# Patient Record
Sex: FEMALE | HISPANIC OR LATINO | Employment: FULL TIME | URBAN - METROPOLITAN AREA
[De-identification: names, ages, dates, MRNs, and addresses within clinical notes are randomized per-mention and may not be internally consistent; named-entity substitution may affect disease eponyms.]

---

## 2019-03-19 ENCOUNTER — OFFICE VISIT (OUTPATIENT)
Dept: FAMILY MEDICINE | Facility: CLINIC | Age: 19
End: 2019-03-19
Payer: COMMERCIAL

## 2019-03-19 ENCOUNTER — LAB VISIT (OUTPATIENT)
Dept: LAB | Facility: HOSPITAL | Age: 19
End: 2019-03-19
Attending: FAMILY MEDICINE
Payer: COMMERCIAL

## 2019-03-19 VITALS
HEIGHT: 65 IN | BODY MASS INDEX: 26.85 KG/M2 | WEIGHT: 161.19 LBS | DIASTOLIC BLOOD PRESSURE: 70 MMHG | HEART RATE: 85 BPM | TEMPERATURE: 99 F | OXYGEN SATURATION: 100 % | SYSTOLIC BLOOD PRESSURE: 108 MMHG

## 2019-03-19 DIAGNOSIS — Z13.5 SCREENING FOR EYE CONDITION: ICD-10-CM

## 2019-03-19 DIAGNOSIS — Z01.419 WELL WOMAN EXAM: Primary | ICD-10-CM

## 2019-03-19 DIAGNOSIS — Z23 NEED FOR HPV VACCINATION: ICD-10-CM

## 2019-03-19 DIAGNOSIS — Z91.89 EXCESSIVE CONSUMPTION OF SODA POP: ICD-10-CM

## 2019-03-19 DIAGNOSIS — T30.0 BURN: ICD-10-CM

## 2019-03-19 DIAGNOSIS — Z23 NEED FOR DIPHTHERIA-TETANUS-PERTUSSIS (TDAP) VACCINE: ICD-10-CM

## 2019-03-19 DIAGNOSIS — Z01.419 WELL WOMAN EXAM: ICD-10-CM

## 2019-03-19 DIAGNOSIS — G44.229 CHRONIC TENSION-TYPE HEADACHE, NOT INTRACTABLE: ICD-10-CM

## 2019-03-19 DIAGNOSIS — Z23 NEED FOR INFLUENZA VACCINATION: ICD-10-CM

## 2019-03-19 LAB
BASOPHILS # BLD AUTO: 0.08 K/UL
BASOPHILS NFR BLD: 0.7 %
DIFFERENTIAL METHOD: ABNORMAL
EOSINOPHIL # BLD AUTO: 0.2 K/UL
EOSINOPHIL NFR BLD: 1.9 %
ERYTHROCYTE [DISTWIDTH] IN BLOOD BY AUTOMATED COUNT: 15.9 %
HCT VFR BLD AUTO: 35.7 %
HGB BLD-MCNC: 10.7 G/DL
IMM GRANULOCYTES # BLD AUTO: 0.03 K/UL
IMM GRANULOCYTES NFR BLD AUTO: 0.3 %
LYMPHOCYTES # BLD AUTO: 3.5 K/UL
LYMPHOCYTES NFR BLD: 32.1 %
MCH RBC QN AUTO: 24.8 PG
MCHC RBC AUTO-ENTMCNC: 30 G/DL
MCV RBC AUTO: 83 FL
MONOCYTES # BLD AUTO: 0.8 K/UL
MONOCYTES NFR BLD: 7.4 %
NEUTROPHILS # BLD AUTO: 6.3 K/UL
NEUTROPHILS NFR BLD: 57.6 %
NRBC BLD-RTO: 0 /100 WBC
PLATELET # BLD AUTO: 399 K/UL
PMV BLD AUTO: 11.7 FL
RBC # BLD AUTO: 4.31 M/UL
WBC # BLD AUTO: 10.99 K/UL

## 2019-03-19 PROCEDURE — 85025 COMPLETE CBC W/AUTO DIFF WBC: CPT

## 2019-03-19 PROCEDURE — 90472 IMMUNIZATION ADMIN EACH ADD: CPT | Mod: S$GLB,,, | Performed by: FAMILY MEDICINE

## 2019-03-19 PROCEDURE — 99999 PR PBB SHADOW E&M-NEW PATIENT-LVL V: CPT | Mod: PBBFAC,,, | Performed by: FAMILY MEDICINE

## 2019-03-19 PROCEDURE — 99385 PREV VISIT NEW AGE 18-39: CPT | Mod: 25,S$GLB,, | Performed by: FAMILY MEDICINE

## 2019-03-19 PROCEDURE — 83036 HEMOGLOBIN GLYCOSYLATED A1C: CPT

## 2019-03-19 PROCEDURE — 80053 COMPREHEN METABOLIC PANEL: CPT

## 2019-03-19 PROCEDURE — 90715 TDAP VACCINE GREATER THAN OR EQUAL TO 7YO IM: ICD-10-PCS | Mod: S$GLB,,, | Performed by: FAMILY MEDICINE

## 2019-03-19 PROCEDURE — 90686 FLU VACCINE (QUAD) GREATER THAN OR EQUAL TO 3YO PRESERVATIVE FREE IM: ICD-10-PCS | Mod: S$GLB,,, | Performed by: FAMILY MEDICINE

## 2019-03-19 PROCEDURE — 99385 PR PREVENTIVE VISIT,NEW,18-39: ICD-10-PCS | Mod: 25,S$GLB,, | Performed by: FAMILY MEDICINE

## 2019-03-19 PROCEDURE — 82306 VITAMIN D 25 HYDROXY: CPT

## 2019-03-19 PROCEDURE — 80061 LIPID PANEL: CPT

## 2019-03-19 PROCEDURE — 90651 HPV VACCINE 9-VALENT 3 DOSE IM: ICD-10-PCS | Mod: S$GLB,,, | Performed by: FAMILY MEDICINE

## 2019-03-19 PROCEDURE — 99999 PR PBB SHADOW E&M-NEW PATIENT-LVL V: ICD-10-PCS | Mod: PBBFAC,,, | Performed by: FAMILY MEDICINE

## 2019-03-19 PROCEDURE — 90651 9VHPV VACCINE 2/3 DOSE IM: CPT | Mod: S$GLB,,, | Performed by: FAMILY MEDICINE

## 2019-03-19 PROCEDURE — 90686 IIV4 VACC NO PRSV 0.5 ML IM: CPT | Mod: S$GLB,,, | Performed by: FAMILY MEDICINE

## 2019-03-19 PROCEDURE — 90715 TDAP VACCINE 7 YRS/> IM: CPT | Mod: S$GLB,,, | Performed by: FAMILY MEDICINE

## 2019-03-19 PROCEDURE — 90472 HPV VACCINE 9-VALENT 3 DOSE IM: ICD-10-PCS | Mod: S$GLB,,, | Performed by: FAMILY MEDICINE

## 2019-03-19 PROCEDURE — 84443 ASSAY THYROID STIM HORMONE: CPT

## 2019-03-19 PROCEDURE — 36415 COLL VENOUS BLD VENIPUNCTURE: CPT | Mod: PO

## 2019-03-19 PROCEDURE — 90471 FLU VACCINE (QUAD) GREATER THAN OR EQUAL TO 3YO PRESERVATIVE FREE IM: ICD-10-PCS | Mod: S$GLB,,, | Performed by: FAMILY MEDICINE

## 2019-03-19 PROCEDURE — 90471 IMMUNIZATION ADMIN: CPT | Mod: S$GLB,,, | Performed by: FAMILY MEDICINE

## 2019-03-19 RX ORDER — SILVER SULFADIAZINE 10 G/1000G
CREAM TOPICAL 2 TIMES DAILY
Qty: 50 G | Refills: 0 | Status: SHIPPED | OUTPATIENT
Start: 2019-03-19 | End: 2019-10-24

## 2019-03-19 NOTE — PROGRESS NOTES
Subjective:       Patient ID: Lucia Emmanuel is a 18 y.o. female.    Chief Complaint: Annual Exam and Establish Care    Lucia Emmanuel is a 18 y.o. female who presents today to establish care.     Diet: she eats fast food about 3 times per week. She eats fried food about 4 times/week. Drinks soda daily. Coffee occasionally. Water daily.   Exercise: no.     She has headaches almost daily.  She has no associated symptoms with the headaches.  Headaches appear to be bilateral.  They seemed to travel down her neck.  She also has a burn on her leg.  This is from yesterday.  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  She is having no pain or drainage or fevers today.    Has scars noted on her forearms.  Upon further questioning, she reports that she had a history of self-harm when she was a teenager.  She has no current suicidal ideation but would like to see a counselor and or therapist.  She misses home also.    Flu: ordered  Tdap: ordered  HPV Vaccine: ordered  Labs: ordered    PMHx: reviewed in EMR and updated  Meds: reviewed in EMR and updated  Shx: reviewed in EMR and updated  FMHx: no family history of colon cancer, breast cancer, ovarian cancer  Social:  she is working at [employer]. She lives with her sister. No pets at home. No other smokers at home. She is originally from [place].         Review of Systems   Constitutional: Negative for chills and fever.   Respiratory: Negative for cough and shortness of breath.    Cardiovascular: Negative for chest pain.   Gastrointestinal: Negative for constipation, diarrhea, nausea and vomiting.   Genitourinary: Negative for difficulty urinating and dysuria.   Skin: Negative for rash.   Neurological: Negative for dizziness, light-headedness and headaches.         Health Maintenance Due   Topic Date Due    Lipid Panel  1999    CHLAMYDIA SCREENING  05/20/2014    HPV Vaccines (1 - Female 3-dose series) 05/20/2014    TETANUS VACCINE  05/20/2017     Pneumococcal Vaccine (Medium Risk) (1 of 1 - PPSV23) 05/20/2018    Influenza Vaccine  08/01/2018         Objective:     Vitals:    03/19/19 1553   BP: 108/70   Pulse: 85   Temp: 98.8 °F (37.1 °C)        Physical Exam   Constitutional: She is oriented to person, place, and time. She appears well-developed and well-nourished.   HENT:   Head: Normocephalic and atraumatic.   Right Ear: Tympanic membrane, external ear and ear canal normal.   Left Ear: Tympanic membrane, external ear and ear canal normal.   Nose: Nose normal.   Mouth/Throat: Oropharynx is clear and moist and mucous membranes are normal.   Eyes: Conjunctivae are normal. Pupils are equal, round, and reactive to light.   Neck: Normal range of motion.   Cardiovascular: Normal rate, regular rhythm and normal heart sounds.   Pulmonary/Chest: Effort normal and breath sounds normal. No respiratory distress.   Abdominal: Soft. Bowel sounds are normal. She exhibits no distension.   Genitourinary:   Genitourinary Comments: deferred    Musculoskeletal: She exhibits no edema.   Tight cervical paraspinals noted   Neurological: She is alert and oriented to person, place, and time. No cranial nerve deficit or sensory deficit. She exhibits normal muscle tone.   Finger to nose intact  Heel to shin intact  Strength and sensation grossly intract BL UE/LE  CN 2-12 grossly intact  PERRLA  Rapid alternating movement intact  Patellar reflex present and equal BL   Skin:   Second degree burn noted  See picture below    Scars horizontal to her forearm noted, left arm worse then the right.    Psychiatric: She has a normal mood and affect. Her speech is normal and behavior is normal. Judgment and thought content normal.   Nursing note and vitals reviewed.              Assessment:       1. Well woman exam    2. Screening for eye condition    3. Chronic tension-type headache, not intractable    4. Burn    5. History of intentional self-harm    6. BMI 26.0-26.9,adult    7. Excessive  consumption of soda pop    8. Need for diphtheria-tetanus-pertussis (Tdap) vaccine    9. Need for influenza vaccination    10. Need for HPV vaccination        Plan:         Well woman exam  ?Avoid tobacco  ?Be physically active  ?Maintain a healthy weight  ?Eat a diet rich in fruits, vegetables, and whole grains, and low in saturated/trans fat  ?Limit alcohol consumption  ?Protect against sexually transmitted infections  ?Avoid excess sun  -     CBC auto differential; Future; Expected date: 03/19/2019  -     Comprehensive metabolic panel; Future; Expected date: 03/19/2019  -     Lipid panel; Future; Expected date: 03/19/2019  -     Hemoglobin A1c; Future; Expected date: 03/19/2019  -     Vitamin D; Future; Expected date: 03/19/2019  -     TSH; Future; Expected date: 03/19/2019    Screening for eye condition  -     Ambulatory referral to Optometry    Chronic tension-type headache, not intractable  Tight cervical paraspinals noted  OMT performed - HVLA  Tolerated well by patient  Rule out vision issues with eye exam  Discussed stretches    Burn  Appears to be superficial second-degree  Burns can be evolving and this happened about 24 hours ago  Instructions given  Silvadene given  Follow up in 2 weeks, earlier if symptoms worsen, redness develops, pain worsens.   -     silver sulfADIAZINE 1% (SILVADENE) 1 % cream; Apply topically 2 (two) times daily.  Dispense: 50 g; Refill: 0    History of intentional self-harm  See HPI  -     Ambulatory referral to Social Work  -     Ambulatory referral to Psychology    BMI 26.0-26.9,adult/Excessive consumption of soda pop  See AVS    Need for diphtheria-tetanus-pertussis (Tdap) vaccine  -     Tdap Vaccine    Need for influenza vaccination  -     Influenza - Quadrivalent (3 years & older) (PF)    Need for HPV vaccination  -     HPV Vaccine (9-Valent) (3 Dose) (IM)  -     HPV Vaccine (9-Valent) (3 Dose) (IM); Future; Expected date: 06/19/2019  -     HPV Vaccine (9-Valent) (3 Dose)  (IM); Future; Expected date: 09/19/2019

## 2019-03-19 NOTE — PATIENT INSTRUCTIONS
?Avoid tobacco  ?Be physically active  ?Maintain a healthy weight  ?Eat a diet rich in fruits, vegetables, and whole grains, and low in saturated/trans fat  ?Limit alcohol consumption  ?Protect against sexually transmitted infections  ?Avoid excess sun    Stop smoking! Follow up with psychology. Decrease soda intake  Keep burn wrapped and covered with the cream I have given you. Apply twice daily.       Hot Water Burn  Hot water on the skin can cause a first- or second-degree burn. A first-degree burn causes only redness and heals in a few days. A second-degree burn is deeper. It causes a blister to form. The blister may break and leak clear fluid. It may become infected. Second-degree burns take 1 to 2 weeks to heal.  Home care  The following guidelines will help you care for your burn at home:  · On the first day, put a small towel soaked in cool water on the area to ease severe pain.  · If no blister formed, you may use creams with benzocaine if the burn is painful. You may also use moisturizers with aloe vera.  · If a blister formed and broke and a bandage was applied, change it once a day, or as directed. If the bandage sticks, remove it by soaking it in warm water. Wash the burned area daily with soap and water. Pat dry with a clean towel. For the next 3 to 5 days, put an antibiotic cream or ointment on the area after washing. This will help to prevent an infection and to keep the bandage from sticking.  · If a blister formed, it will go down by itself. Or it will break on its own in the next few days. If the blister breaks, a clear fluid will leak from it for a day or two. The loose skin from the broken blister has no feeling.  You can carefully trim away this skin with clean, small, sharp scissors. Sterilize by soaking in alcohol first. Or wash with soap and water. Wash the raw surface under the blister daily with soap and water. For the next 3 to 5 days, put an antibiotic cream or ointment on the area after  washing. This will help prevent an infection and keep the bandage from sticking.  · You may use over-the-counter medicine to control pain, unless another pain medicine was prescribed. If you have chronic liver or kidney disease, talk with your healthcare provider before using acetaminophen or ibuprofen. Also talk with your provider if you've had a stomach ulcer or GI bleeding. Don't give ibuprofen to children younger than 6 months of age.  · Don't pick or scratch at the affected areas. Use over-the-counter medicine for itching.  · Wear a hat, sunscreen, and long sleeves while in the sun.  · Don't wear tight-fitting clothes.  · Add more calories and protein to your diet until the wound has healed. Drink plenty of water.  Follow-up care  Follow up with your healthcare provider, or as advised. Most hot water burns heal without becoming infected. Sometimes an infection happens even with proper treatment. You should watch for the signs of infection listed below.  When to seek medical advice  Call your healthcare provider right away if any of these occur:  · Pain that gets worse  · Redness or swelling that gets worse  · Pus coming from the wound  · Fever of 100.4º F (38º C) or higher, or as directed by your healthcare provider  · The wound doesn't seem to be healing  · Nausea or vomiting   Date Last Reviewed: 12/1/2016  © 3014-3154 [employer] [address] [place] [address]. All rights reserved. This information is not intended as a substitute for professional medical care. Always follow your healthcare professional's instructions.            Tension Headache    A muscle tension headache is a very common cause of head pain. Its also called a stress headache. When some people are under stress, they tense the muscles of their shoulder, neck, and scalp without knowing it. If this tension lasts long enough, a headache can occur. A tension headache can be quite painful. It can last for hours or even  days.  Home care  Follow these tips when caring for yourself at home:  · Dont drive yourself home if you were given pain medicine for your headache. Instead, have someone else drive you home. Try to sleep when you get home. You should feel much better when you wake up.  · Put heat on the back of your neck to help ease neck spasm.  · Drink only clear liquids or eat a light diet until your symptoms get better. This will help you avoid nausea or vomiting.  How to prevent headaches  · Figure out what is causing stress in your life. Learn new ways to handle your stress. Ideas include regular exercise, biofeedback, self-hypnosis, yoga, and meditation. Talk with your healthcare provider to find out more information about managing stress. Many books and digital media are also available on this subject.  · Take time out at the first sign of a tension headache, if possible. Take yourself out of the stressful situation. Find a quiet, comfortable place to sit or lie down and let yourself relax. Heat and deep massage of the tight areas in the neck and shoulders may help ease muscle spasm. You may also get relief from a medicine like ibuprofen or a prescribed muscle relaxant.  Follow-up care  Follow up with your healthcare provider, or as advised. Talk with your provider if you have frequent headaches. He or she can figure out a treatment plan. Ask if you can have medicine to take at home the next time you get a bad headache. This may keep you from having to visit the emergency department in the future. You may need to see a headache specialist (neurologist) if you continue to have headaches.  When to seek medical advice  Call your healthcare provider right away if any of these occur:  · Your head pain gets worse during sexual intercourse or strenuous activity  · Your head pain doesnt get better within 24 hours  · You arent able to keep liquids down (repeated vomiting)  · Fever of 100.4ºF (38ºC) or higher, or as directed by  your healthcare provider  · Stiff neck  · Extreme drowsiness, confusion, or fainting  · Dizziness or dizziness with spinning sensation (vertigo)  · Weakness in an arm or leg or one side of your face  · You have difficulty speaking  · Your vision changes  Date Last Reviewed: 8/1/2016  © 8886-0465 [employer] [address] [place] [address], [place] [address]. All rights reserved. This information is not intended as a substitute for professional medical care. Always follow your healthcare professional's instructions.

## 2019-03-20 ENCOUNTER — TELEPHONE (OUTPATIENT)
Dept: FAMILY MEDICINE | Facility: CLINIC | Age: 19
End: 2019-03-20

## 2019-03-20 DIAGNOSIS — D64.9 NORMOCYTIC ANEMIA: Primary | ICD-10-CM

## 2019-03-20 DIAGNOSIS — R79.89 LOW VITAMIN D LEVEL: ICD-10-CM

## 2019-03-20 LAB
25(OH)D3+25(OH)D2 SERPL-MCNC: 11 NG/ML
ALBUMIN SERPL BCP-MCNC: 4 G/DL
ALP SERPL-CCNC: 77 U/L
ALT SERPL W/O P-5'-P-CCNC: 11 U/L
ANION GAP SERPL CALC-SCNC: 8 MMOL/L
AST SERPL-CCNC: 13 U/L
BILIRUB SERPL-MCNC: 0.3 MG/DL
BUN SERPL-MCNC: 10 MG/DL
CALCIUM SERPL-MCNC: 9.2 MG/DL
CHLORIDE SERPL-SCNC: 106 MMOL/L
CHOLEST SERPL-MCNC: 136 MG/DL
CHOLEST/HDLC SERPL: 3.2 {RATIO}
CO2 SERPL-SCNC: 24 MMOL/L
CREAT SERPL-MCNC: 0.8 MG/DL
EST. GFR  (AFRICAN AMERICAN): >60 ML/MIN/1.73 M^2
EST. GFR  (NON AFRICAN AMERICAN): >60 ML/MIN/1.73 M^2
ESTIMATED AVG GLUCOSE: 103 MG/DL
GLUCOSE SERPL-MCNC: 75 MG/DL
HBA1C MFR BLD HPLC: 5.2 %
HDLC SERPL-MCNC: 43 MG/DL
HDLC SERPL: 31.6 %
LDLC SERPL CALC-MCNC: 80.2 MG/DL
NONHDLC SERPL-MCNC: 93 MG/DL
POTASSIUM SERPL-SCNC: 3.8 MMOL/L
PROT SERPL-MCNC: 7.2 G/DL
SODIUM SERPL-SCNC: 138 MMOL/L
TRIGL SERPL-MCNC: 64 MG/DL
TSH SERPL DL<=0.005 MIU/L-ACNC: 0.95 UIU/ML

## 2019-03-20 RX ORDER — ERGOCALCIFEROL 1.25 MG/1
50000 CAPSULE ORAL
Qty: 12 CAPSULE | Refills: 0 | Status: SHIPPED | OUTPATIENT
Start: 2019-03-20

## 2019-03-20 NOTE — TELEPHONE ENCOUNTER
Anamaria,   Please ask patient if she was having her period. I ask because she had a little bit of anemia. I would like to repeat her blood work in 6 weeks.     You have low vitamin D and a Rx has been sent to your pharmacy. Take this pill once a week and when the prescription and refills are done, start taking an OTC vitamin D supplementation daily.     Other labs were all normal.

## 2019-03-20 NOTE — TELEPHONE ENCOUNTER
Spoke with patient about lab result.  Patient verbalized understanding on instruction given.  Patient will be doing lab in 6 wks in 05/01/19 to recheck anemia.

## 2019-04-03 ENCOUNTER — OFFICE VISIT (OUTPATIENT)
Dept: OPTOMETRY | Facility: CLINIC | Age: 19
End: 2019-04-03
Payer: COMMERCIAL

## 2019-04-03 DIAGNOSIS — H52.7 REFRACTIVE ERROR: ICD-10-CM

## 2019-04-03 DIAGNOSIS — G44.229 CHRONIC TENSION-TYPE HEADACHE, NOT INTRACTABLE: Primary | ICD-10-CM

## 2019-04-03 PROCEDURE — 99999 PR PBB SHADOW E&M-EST. PATIENT-LVL II: ICD-10-PCS | Mod: PBBFAC,,, | Performed by: OPTOMETRIST

## 2019-04-03 PROCEDURE — 92015 DETERMINE REFRACTIVE STATE: CPT | Mod: S$GLB,,, | Performed by: OPTOMETRIST

## 2019-04-03 PROCEDURE — 92004 PR EYE EXAM, NEW PATIENT,COMPREHESV: ICD-10-PCS | Mod: S$GLB,,, | Performed by: OPTOMETRIST

## 2019-04-03 PROCEDURE — 99999 PR PBB SHADOW E&M-EST. PATIENT-LVL II: CPT | Mod: PBBFAC,,, | Performed by: OPTOMETRIST

## 2019-04-03 PROCEDURE — 92015 PR REFRACTION: ICD-10-PCS | Mod: S$GLB,,, | Performed by: OPTOMETRIST

## 2019-04-03 PROCEDURE — 92004 COMPRE OPH EXAM NEW PT 1/>: CPT | Mod: S$GLB,,, | Performed by: OPTOMETRIST

## 2019-04-03 NOTE — LETTER
April 3, 2019      Connor Durbin, DO  [address] [facility] [place] [address]           [facility]  [address] [place] [address]  Phone: [phone]  Fax: [phone]          Patient: Lucia Emmanuel   MR Number: [id number]   YOB: 2000   Date of Visit: 4/3/2019       Dear Dr. Connor Durbin:    Thank you for referring Lucia Emmanuel to me for evaluation. Attached you will find relevant portions of my assessment and plan of care.    If you have questions, please do not hesitate to call me. I look forward to following Lucia Emmanuel along with you.    Sincerely,    Janie Desir, OD    Enclosure  CC:  No Recipients    If you would like to receive this communication electronically, please contact [email or web address] or [phone] to request more information on [employer] access.    For providers and/or their staff who would like to refer a patient to [facility], please contact us through our one-stop-shop provider referral line, [facility] , at [phone].    If you feel you have received this communication in error or would no longer like to receive these types of communications, please e-mail [email or web address]

## 2019-04-04 NOTE — PROGRESS NOTES
HPI     JOCE: never     (-)SRx  (+)HA   -light sensitive   -occur around temple area  (-)aura  -Occurring for 5 months   (+)tylenol helps   -occurs more outside, goes home and turns everything off and rests and   that usually helps    (-)pain, itching, irritation  (-)gtts  (-)eye injures, surgeries     (-)POHx  (+)FOHx, sister has myopia     Recently given vitamin D given for anemia.     Last edited by Janie Desir, OD on 4/3/2019  9:19 PM. (History)            Assessment /Plan     For exam results, see Encounter Report.    Chronic tension-type headache, not intractable    Refractive error      1. Educated pt on findings. HA not suggested to be related to refractive error or ocular pathology. (-)ONH edema. HA occurs both with distance and near activities as well as occur upon waking. Given SRx for pt to try if interested but recommend pt to f/u with primary care/neuro for further HA evaluation. Monitor.   2. Educated pt. SRx not necessary but will help pt see better, especially OD. Monitor yearly.     RTC in 1 year for annual eye exam or prn.

## 2019-04-09 ENCOUNTER — OFFICE VISIT (OUTPATIENT)
Dept: FAMILY MEDICINE | Facility: CLINIC | Age: 19
End: 2019-04-09
Payer: COMMERCIAL

## 2019-04-09 VITALS
BODY MASS INDEX: 26.6 KG/M2 | DIASTOLIC BLOOD PRESSURE: 70 MMHG | HEART RATE: 97 BPM | SYSTOLIC BLOOD PRESSURE: 126 MMHG | WEIGHT: 159.63 LBS | HEIGHT: 65 IN | OXYGEN SATURATION: 99 %

## 2019-04-09 DIAGNOSIS — G44.229 CHRONIC TENSION-TYPE HEADACHE, NOT INTRACTABLE: ICD-10-CM

## 2019-04-09 DIAGNOSIS — T30.0 BURN: ICD-10-CM

## 2019-04-09 DIAGNOSIS — D64.9 NORMOCYTIC ANEMIA: Primary | ICD-10-CM

## 2019-04-09 DIAGNOSIS — R79.89 LOW VITAMIN D LEVEL: ICD-10-CM

## 2019-04-09 PROCEDURE — 3008F PR BODY MASS INDEX (BMI) DOCUMENTED: ICD-10-PCS | Mod: CPTII,S$GLB,, | Performed by: FAMILY MEDICINE

## 2019-04-09 PROCEDURE — 3008F BODY MASS INDEX DOCD: CPT | Mod: CPTII,S$GLB,, | Performed by: FAMILY MEDICINE

## 2019-04-09 PROCEDURE — 99213 OFFICE O/P EST LOW 20 MIN: CPT | Mod: S$GLB,,, | Performed by: FAMILY MEDICINE

## 2019-04-09 PROCEDURE — 99213 PR OFFICE/OUTPT VISIT, EST, LEVL III, 20-29 MIN: ICD-10-PCS | Mod: S$GLB,,, | Performed by: FAMILY MEDICINE

## 2019-04-09 PROCEDURE — 99999 PR PBB SHADOW E&M-EST. PATIENT-LVL III: CPT | Mod: PBBFAC,,, | Performed by: FAMILY MEDICINE

## 2019-04-09 PROCEDURE — 99999 PR PBB SHADOW E&M-EST. PATIENT-LVL III: ICD-10-PCS | Mod: PBBFAC,,, | Performed by: FAMILY MEDICINE

## 2019-04-09 NOTE — PROGRESS NOTES
Subjective:       Patient ID: Lucia Emmanuel is a 18 y.o. female.    Chief Complaint: Follow-up (on meds)    Lucia is a 18 y.o. female who presents today for follow-up on her burn and her chronic tension headaches.  She has been using the Silvadene cream and it has significantly improved her burn.  The burn appears to not have any surrounding erythema.  The burn has improved.  Her headaches have also improved.  OMT was performed at the last visit and she found this to be beneficial.  She went to get her eyes checked and this was normal.  The optometrist also believe that her headaches were due to tension headaches and not due to any vision issues.    She has started her vitamin-D supplementation.  She is not taking her iron supplementation.  Was explained to her that she needs to take this and recheck her labs in 4 weeks as scheduled.      Review of Systems   Constitutional: Negative for chills and fever.   Respiratory: Negative for cough and shortness of breath.    Cardiovascular: Negative for chest pain.   Gastrointestinal: Negative for constipation, diarrhea, nausea and vomiting.   Genitourinary: Negative for difficulty urinating and dysuria.   Skin: Positive for wound. Negative for rash.   Neurological: Positive for headaches. Negative for dizziness and light-headedness.           Objective:     Vitals:    04/09/19 1546   BP: 126/70   Pulse: 97        Physical Exam   Constitutional: She appears well-developed and well-nourished.   HENT:   Head: Normocephalic and atraumatic.   Eyes: Conjunctivae are normal.   Cardiovascular: Normal rate and regular rhythm.   Pulmonary/Chest: Effort normal and breath sounds normal.   Musculoskeletal: She exhibits tenderness.   Tight cervical paraspinals noted    Neurological:   Neuro exam was normal at the last visit, not repeated today   Skin:   Burn appears significantly better     Psychiatric: She has a normal mood and affect. Her behavior is normal. Judgment and thought  content normal.   Nursing note and vitals reviewed.      Assessment:       1. Normocytic anemia    2. Chronic tension-type headache, not intractable    3. Low vitamin D level    4. Burn        Plan:         You have low vitamin D and a Rx has been sent to your pharmacy. Take this pill once a week and when the prescription and refills are done, start taking an OTC vitamin D supplementation daily.     Silvadene cream for one more week. Keep area covered with sunblock to help healing/scarring when you go out in the sun.    Start iron twice daily OTC and recheck anemia as scheduled in may.     RTC as needed for tension headaches. Review last handouts regarding tension headaches. (see previous AVS)    Normocytic anemia    Chronic tension-type headache, not intractable    Low vitamin D level    Burn        Warning signs discussed, patient to call with any further issues or worsening of symptoms.

## 2019-05-01 ENCOUNTER — LAB VISIT (OUTPATIENT)
Dept: LAB | Facility: HOSPITAL | Age: 19
End: 2019-05-01
Attending: FAMILY MEDICINE
Payer: COMMERCIAL

## 2019-05-01 DIAGNOSIS — D64.9 NORMOCYTIC ANEMIA: ICD-10-CM

## 2019-05-01 LAB
BASOPHILS # BLD AUTO: 0.08 K/UL (ref 0–0.2)
BASOPHILS NFR BLD: 0.8 % (ref 0–1.9)
DIFFERENTIAL METHOD: ABNORMAL
EOSINOPHIL # BLD AUTO: 0.2 K/UL (ref 0–0.5)
EOSINOPHIL NFR BLD: 2 % (ref 0–8)
ERYTHROCYTE [DISTWIDTH] IN BLOOD BY AUTOMATED COUNT: 16.4 % (ref 11.5–14.5)
FERRITIN SERPL-MCNC: 27 NG/ML (ref 20–300)
HCT VFR BLD AUTO: 38.1 % (ref 37–48.5)
HGB BLD-MCNC: 11.6 G/DL (ref 12–16)
IMM GRANULOCYTES # BLD AUTO: 0.03 K/UL (ref 0–0.04)
IMM GRANULOCYTES NFR BLD AUTO: 0.3 % (ref 0–0.5)
LYMPHOCYTES # BLD AUTO: 4 K/UL (ref 1–4.8)
LYMPHOCYTES NFR BLD: 37.9 % (ref 18–48)
MCH RBC QN AUTO: 25.5 PG (ref 27–31)
MCHC RBC AUTO-ENTMCNC: 30.4 G/DL (ref 32–36)
MCV RBC AUTO: 84 FL (ref 82–98)
MONOCYTES # BLD AUTO: 1 K/UL (ref 0.3–1)
MONOCYTES NFR BLD: 9.4 % (ref 4–15)
NEUTROPHILS # BLD AUTO: 5.2 K/UL (ref 1.8–7.7)
NEUTROPHILS NFR BLD: 49.6 % (ref 38–73)
NRBC BLD-RTO: 0 /100 WBC
PLATELET # BLD AUTO: 343 K/UL (ref 150–350)
PMV BLD AUTO: 11.8 FL (ref 9.2–12.9)
RBC # BLD AUTO: 4.55 M/UL (ref 4–5.4)
WBC # BLD AUTO: 10.52 K/UL (ref 3.9–12.7)

## 2019-05-01 PROCEDURE — 85025 COMPLETE CBC W/AUTO DIFF WBC: CPT

## 2019-05-01 PROCEDURE — 36415 COLL VENOUS BLD VENIPUNCTURE: CPT | Mod: PO

## 2019-05-01 PROCEDURE — 82728 ASSAY OF FERRITIN: CPT

## 2019-05-02 ENCOUNTER — PATIENT MESSAGE (OUTPATIENT)
Dept: FAMILY MEDICINE | Facility: CLINIC | Age: 19
End: 2019-05-02

## 2019-10-24 ENCOUNTER — OFFICE VISIT (OUTPATIENT)
Dept: FAMILY MEDICINE | Facility: CLINIC | Age: 19
End: 2019-10-24
Payer: COMMERCIAL

## 2019-10-24 ENCOUNTER — HOSPITAL ENCOUNTER (OUTPATIENT)
Dept: RADIOLOGY | Facility: HOSPITAL | Age: 19
Discharge: HOME | End: 2019-10-24
Attending: FAMILY MEDICINE
Payer: COMMERCIAL

## 2019-10-24 VITALS
OXYGEN SATURATION: 99 % | WEIGHT: 169.56 LBS | SYSTOLIC BLOOD PRESSURE: 118 MMHG | HEIGHT: 65 IN | DIASTOLIC BLOOD PRESSURE: 70 MMHG | BODY MASS INDEX: 28.25 KG/M2 | HEART RATE: 97 BPM | TEMPERATURE: 99 F

## 2019-10-24 DIAGNOSIS — R04.0 EPISTAXIS: Primary | ICD-10-CM

## 2019-10-24 DIAGNOSIS — R06.2 WHEEZING: ICD-10-CM

## 2019-10-24 PROCEDURE — 99214 PR OFFICE/OUTPT VISIT, EST, LEVL IV, 30-39 MIN: ICD-10-PCS | Mod: S$GLB,,, | Performed by: FAMILY MEDICINE

## 2019-10-24 PROCEDURE — 71046 X-RAY EXAM CHEST 2 VIEWS: CPT | Mod: 26,,, | Performed by: RADIOLOGY

## 2019-10-24 PROCEDURE — 99999 PR PBB SHADOW E&M-EST. PATIENT-LVL IV: CPT | Mod: PBBFAC,,, | Performed by: FAMILY MEDICINE

## 2019-10-24 PROCEDURE — 3008F PR BODY MASS INDEX (BMI) DOCUMENTED: ICD-10-PCS | Mod: CPTII,S$GLB,, | Performed by: FAMILY MEDICINE

## 2019-10-24 PROCEDURE — 71046 XR CHEST PA AND LATERAL: ICD-10-PCS | Mod: 26,,, | Performed by: RADIOLOGY

## 2019-10-24 PROCEDURE — 3008F BODY MASS INDEX DOCD: CPT | Mod: CPTII,S$GLB,, | Performed by: FAMILY MEDICINE

## 2019-10-24 PROCEDURE — 99999 PR PBB SHADOW E&M-EST. PATIENT-LVL IV: ICD-10-PCS | Mod: PBBFAC,,, | Performed by: FAMILY MEDICINE

## 2019-10-24 PROCEDURE — 99214 OFFICE O/P EST MOD 30 MIN: CPT | Mod: S$GLB,,, | Performed by: FAMILY MEDICINE

## 2019-10-24 PROCEDURE — 71046 X-RAY EXAM CHEST 2 VIEWS: CPT | Mod: TC,FY,PO

## 2019-10-24 RX ORDER — OXYMETAZOLINE HCL 0.05 %
1 SPRAY, NON-AEROSOL (ML) NASAL 2 TIMES DAILY PRN
Qty: 30 ML | Refills: 0 | Status: SHIPPED | OUTPATIENT
Start: 2019-10-24 | End: 2019-10-27

## 2019-10-24 NOTE — PROGRESS NOTES
"Subjective:       Patient ID: Lucia Emmanuel is a 19 y.o. female.    Chief Complaint: Epistaxis (x 1 day)    Lucia is a 19 y.o. female who presents today with epistaxis. She has had recurrent episodes. First episode was about a month ago; had bleeding in the shower. It bled for about 8 minutes. It stopped spontaneously. Denies any trauma. Was not picking her nose. Yesterday, this happened again. She was having a "stopped up yesterday." She denies any nose picking. No frequent use of tissues. It is always her left nostril. This bled for 5 minutes. This stopped spontaneously as well.     No other easy bruising or bleeding. She is on depo. No menses x 3 months. Occasionally bleeding when she brushes her teeth; mostly when she brushes "too hard." No hemoptysis. No hematochezia.       Review of Systems   HENT: Positive for nosebleeds. Negative for congestion and dental problem.    Respiratory: Negative for cough, shortness of breath and wheezing.    Cardiovascular: Negative for chest pain and leg swelling.   Genitourinary: Negative for difficulty urinating and dyspareunia.   Skin: Negative for rash.   Hematological: Negative for adenopathy. Does not bruise/bleed easily.         Objective:     Vitals:    10/24/19 1123   BP: 118/70   Pulse: 97   Temp: 98.6 °F (37 °C)        Physical Exam   Constitutional: She appears well-developed and well-nourished. No distress.   HENT:   Head: Normocephalic and atraumatic.   Right Ear: External ear normal.   Left Ear: External ear normal.   Mild nasal congestion (right)  Swollen turbinates (right)  No area of bleeding noted  No excoriations noted   Eyes: Pupils are equal, round, and reactive to light. Conjunctivae and EOM are normal.   Cardiovascular: Normal rate and regular rhythm.   Pulmonary/Chest: Effort normal. No stridor. No respiratory distress. She has wheezes.   Wheezing noted in left middle and lower lobe.   Slightly improved with coughing, but not resolved "   Neurological: She is alert.   Skin: Skin is warm. She is not diaphoretic.   Psychiatric: She has a normal mood and affect. Her behavior is normal. Judgment and thought content normal.   Nursing note and vitals reviewed.      Assessment:       1. Epistaxis    2. Wheezing        Plan:       Epistaxis is a common problem, occurring in up to 60 percent of the general population. While most episodes are uncomplicated, epistaxis can occasionally be difficult to control. Knowledge of the basic anatomy of the nasopharynx and a few hemostatic strategies are reassuring.     Poperly instructed patients may achieve hemostasis unassisted while the evaluation gets underway. The following approach may be helpful:    ?Patient blows their nose to remove blood and clots.  ?Sprays the nares with oxymetazoline.  ?Patient pinches the alae tightly against the septum and holds continuously for 10 minutes (picture given)    Many otolaryngologists recommend initial treatment with two sprays of oxymetazoline (Afrin) to hasten hemostasis    Use nasal saline twice daily    If not better, or worsening, will refer to ENT.     There is left lung wheezing; unclear why. Viral vs other. No history of asthma. CXR today. If worsening, or wheezing is noticeable, contact me and I will treat. If CXR is clear for pneumonia, will just treat symptomatically at this time as patient is not SOB. She did not know she was wheezing.       Epistaxis  -     oxymetazoline (AFRIN, OXYMETAZOLINE,) 0.05 % nasal spray; 1 spray by Nasal route 2 (two) times daily as needed for Congestion.  Dispense: 30 mL; Refill: 0    Wheezing  -     X-Ray Chest PA And Lateral; Future; Expected date: 10/24/2019      Warning signs discussed, patient to call with any further issues or worsening of symptoms.

## 2019-10-24 NOTE — PATIENT INSTRUCTIONS
Epistaxis is a common problem, occurring in up to 60 percent of the general population. While most episodes are uncomplicated, epistaxis can occasionally be difficult to control. Knowledge of the basic anatomy of the nasopharynx and a few hemostatic strategies are reassuring.     Poperly instructed patients may achieve hemostasis unassisted while the evaluation gets underway. The following approach may be helpful:    ?Patient blows their nose to remove blood and clots.  ?Sprays the nares with oxymetazoline.  ?Patient pinches the alae tightly against the septum and holds continuously for 10 minutes    Many otolaryngologists recommend initial treatment with two sprays of oxymetazoline (Afrin) to hasten hemostasis    Use nasal saline twice daily    There is left lung wheezing; unclear why. Viral vs other. No history of asthma. CXR today. If worsening, or wheezing is noticeable, contact me and I will treat. If CXR is clear for pneumonia, will just treat symptomatically at this time as patient is not SOB.

## 2020-04-23 ENCOUNTER — LAB VISIT (OUTPATIENT)
Dept: PRIMARY CARE CLINIC | Facility: CLINIC | Age: 20
End: 2020-04-23
Payer: COMMERCIAL

## 2020-04-23 DIAGNOSIS — R05.9 COUGH: Primary | ICD-10-CM

## 2020-04-23 LAB — SARS-COV-2 RNA RESP QL NAA+PROBE: NOT DETECTED

## 2020-04-23 PROCEDURE — U0002 COVID-19 LAB TEST NON-CDC: HCPCS

## 2020-04-24 ENCOUNTER — TELEPHONE (OUTPATIENT)
Dept: INTERNAL MEDICINE | Facility: CLINIC | Age: 20
End: 2020-04-24

## 2020-04-24 NOTE — TELEPHONE ENCOUNTER
Pt is Citizen of [place] speaking-no english- Citizen of [place] Language brbonlic-[phone]- yaritza used-ID # [id number] used to speak with patient. Pt notified of  Not detected covid test- Can obtain copy of results from [facility] medical records or patient portal on "[employer] website. Verbalized understanding via  .Message-

## 2020-07-17 ENCOUNTER — LAB VISIT (OUTPATIENT)
Dept: PRIMARY CARE CLINIC | Facility: OTHER | Age: 20
End: 2020-07-17
Attending: INTERNAL MEDICINE
Payer: COMMERCIAL

## 2020-07-17 DIAGNOSIS — R51.9 HEAD ACHE: ICD-10-CM

## 2020-07-17 DIAGNOSIS — R43.9 PROBLEMS WITH SMELL AND TASTE: ICD-10-CM

## 2020-07-17 PROCEDURE — U0003 INFECTIOUS AGENT DETECTION BY NUCLEIC ACID (DNA OR RNA); SEVERE ACUTE RESPIRATORY SYNDROME CORONAVIRUS 2 (SARS-COV-2) (CORONAVIRUS DISEASE [COVID-19]), AMPLIFIED PROBE TECHNIQUE, MAKING USE OF HIGH THROUGHPUT TECHNOLOGIES AS DESCRIBED BY CMS-2020-01-R: HCPCS

## 2020-07-21 LAB — SARS-COV-2 RNA RESP QL NAA+PROBE: POSITIVE

## 2020-07-22 DIAGNOSIS — U07.1 COVID-19 VIRUS DETECTED: ICD-10-CM

## 2021-01-05 ENCOUNTER — PATIENT MESSAGE (OUTPATIENT)
Dept: ADMINISTRATIVE | Facility: HOSPITAL | Age: 21
End: 2021-01-05

## 2021-04-05 ENCOUNTER — PATIENT MESSAGE (OUTPATIENT)
Dept: ADMINISTRATIVE | Facility: HOSPITAL | Age: 21
End: 2021-04-05

## 2021-04-16 ENCOUNTER — PATIENT MESSAGE (OUTPATIENT)
Dept: RESEARCH | Facility: HOSPITAL | Age: 21
End: 2021-04-16

## 2021-07-06 ENCOUNTER — PATIENT MESSAGE (OUTPATIENT)
Dept: ADMINISTRATIVE | Facility: HOSPITAL | Age: 21
End: 2021-07-06

## 2021-10-04 ENCOUNTER — PATIENT MESSAGE (OUTPATIENT)
Dept: ADMINISTRATIVE | Facility: HOSPITAL | Age: 21
End: 2021-10-04

## 2022-01-10 ENCOUNTER — PATIENT MESSAGE (OUTPATIENT)
Dept: ADMINISTRATIVE | Facility: HOSPITAL | Age: 22
End: 2022-01-10
Payer: COMMERCIAL

## 2022-02-14 ENCOUNTER — PATIENT MESSAGE (OUTPATIENT)
Dept: RESEARCH | Facility: HOSPITAL | Age: 22
End: 2022-02-14
Payer: COMMERCIAL

## 2022-05-31 ENCOUNTER — PATIENT MESSAGE (OUTPATIENT)
Dept: ADMINISTRATIVE | Facility: HOSPITAL | Age: 22
End: 2022-05-31
Payer: COMMERCIAL

## 2023-12-03 NOTE — PATIENT INSTRUCTIONS
You have low vitamin D and a Rx has been sent to your pharmacy. Take this pill once a week and when the prescription and refills are done, start taking an OTC vitamin D supplementation daily.     Silvadene cream for one more week. Keep area covered with sunblock to help healing/scarring when you go out in the sun.    Start iron twice daily OTC and recheck anemia as scheduled in may.     RTC as needed for tension headaches. Review last handouts regarding tension headaches. (see previous AVS)  
Unknown if ever smoked